# Patient Record
Sex: FEMALE | Race: WHITE
[De-identification: names, ages, dates, MRNs, and addresses within clinical notes are randomized per-mention and may not be internally consistent; named-entity substitution may affect disease eponyms.]

---

## 2019-09-05 ENCOUNTER — HOSPITAL ENCOUNTER (OUTPATIENT)
Dept: HOSPITAL 92 - BICMAMMO | Age: 74
Discharge: HOME | End: 2019-09-05
Payer: MEDICARE

## 2019-09-05 DIAGNOSIS — N64.89: ICD-10-CM

## 2019-09-05 DIAGNOSIS — M54.5: ICD-10-CM

## 2019-09-05 DIAGNOSIS — Z79.899: ICD-10-CM

## 2019-09-05 DIAGNOSIS — Z12.31: Primary | ICD-10-CM

## 2019-09-05 DIAGNOSIS — M85.852: ICD-10-CM

## 2019-09-05 DIAGNOSIS — M80.00XA: ICD-10-CM

## 2019-09-05 DIAGNOSIS — M80.88XA: ICD-10-CM

## 2019-09-05 DIAGNOSIS — Z98.82: ICD-10-CM

## 2019-09-05 DIAGNOSIS — M85.851: ICD-10-CM

## 2019-09-05 DIAGNOSIS — Z91.89: ICD-10-CM

## 2019-09-05 PROCEDURE — 77067 SCR MAMMO BI INCL CAD: CPT

## 2019-09-05 PROCEDURE — 77080 DXA BONE DENSITY AXIAL: CPT

## 2019-09-05 PROCEDURE — 77063 BREAST TOMOSYNTHESIS BI: CPT

## 2019-09-05 NOTE — BD
EXAM: DEXA bone density examination



HISTORY: 74-year-old postmenopausal female for screening



COMPARISON: 11/19/2014



FINDINGS:

Left femoral neck--bone mineral density0.580; T score -2.4

Total proximal left femur--bone mineral density 0.787; T score -1.3



Right femoral neck--bone mineral density0.616; T score -2.1

Total proximal right femur--bone mineral density 0.816; T score -1.0



IMPRESSION: Osteopenia. When compared to the prior examination, the bone density in the right femoral
 neck has increased approximately 4.5%.



Reported By: Sonny Kaur 

Electronically Signed:  9/5/2019 2:48 PM

## 2019-09-05 NOTE — MMO
Bilateral MAMMO Bilat Screen [facility].

 

CLINICAL HISTORY:

Patient is 74 years old and is seen for screening. The patient has no family

history of breast cancer.  The patient has no personal history of cancer. The

patient has a history of bilateral Implants - benign and bilateral Excisional

Biopsy - benign.

 

VIEWS:

The views performed were:  bilateral craniocaudal; bilateral craniocaudal with

tomosynthesis; bilateral mediolateral oblique; bilateral mediolateral oblique

with tomosynthesis; and bilateral Implant displaced with tomosynthesis.

 

FILMS COMPARED:

The present examination has been compared to prior imaging studies performed at

[facility] on 03/21/2018, and at [facility] on

11/19/2014, 01/11/2016 and 02/07/2017.

 

MAMMOGRAM FINDINGS:

The breasts are heterogeneously dense, which could obscure a lesion on

mammography.

 

Finding 1:  Normal implants are present.

 

Finding 2:  There are stable benign appearing calcifications seen in the right

breast.

 

There are no suspicious masses, suspicious calcifications, or new areas of

architectural distortion.

 

IMPRESSION:

FINDING 1:  IMPLANT FINDINGS IN BOTH BREASTS ARE BENIGN.

 

BILATERAL BREAST IMPLANTS LIMIT SENSITIVITY OF MAMMOGRAPHY, AND COULD OBSCURE

UNDERLYING LESIONS.

 

FINDING 2:  STABLE CALCIFICATIONS IN THE RIGHT BREAST ARE BENIGN.

 

A ROUTINE FOLLOW-UP MAMMOGRAM IN 1 YEAR IS RECOMMENDED.

 

THE RESULTS OF THIS EXAM WERE SENT TO THE PATIENT.

 

ACR BI-RADS Category 2 - Benign finding

 

MAMMOGRAPHY NOTE:

 1. A negative mammogram report should not delay a biopsy if a dominant of

 clinically suspicious mass is present.

 2. Approximately 10% to 15% of breast cancers are not detected by

 mammography.

 3. Adenosis and dense breasts may obscure an underlying neoplasm.

 

 

Reported by: HUGH LOBO MD

Electonically Signed: [id number]

## 2020-12-21 ENCOUNTER — HOSPITAL ENCOUNTER (OUTPATIENT)
Dept: HOSPITAL 92 - BICMAMMO | Age: 75
Discharge: HOME | End: 2020-12-21
Payer: MEDICARE

## 2020-12-21 DIAGNOSIS — Z98.82: ICD-10-CM

## 2020-12-21 DIAGNOSIS — Z12.31: Primary | ICD-10-CM

## 2020-12-21 DIAGNOSIS — Z91.89: ICD-10-CM

## 2020-12-21 PROCEDURE — 77063 BREAST TOMOSYNTHESIS BI: CPT

## 2020-12-21 PROCEDURE — 77067 SCR MAMMO BI INCL CAD: CPT

## 2020-12-21 NOTE — MMO
Bilateral MAMMO Bilat Screen [facility].

 

CLINICAL HISTORY:

Patient is 75 years old and is seen for screening. The patient has no family

history of breast cancer.  The patient has no personal history of cancer. The

patient has a history of bilateral Implants - benign and bilateral Excisional

Biopsy - benign.

 

VIEWS:

The views performed were:  bilateral craniocaudal with tomosynthesis; bilateral

mediolateral oblique with tomosynthesis; and bilateral Implant displaced.

 

FILMS COMPARED:

The present examination has been compared to prior imaging studies performed at

[facility] on 03/21/2018 and 09/05/2019, and at [facility] on 01/11/2016 and 02/07/2017.

 

This study has been interpreted with the assistance of computer-aided detection.

 

MAMMOGRAM FINDINGS:

The breasts are heterogeneously dense, which could obscure a lesion on

mammography.

 

There are no suspicious masses, suspicious calcifications, or new areas of

architectural distortion.

 

IMPRESSION:

THERE IS NO MAMMOGRAPHIC EVIDENCE OF MALIGNANCY.

 

A ROUTINE FOLLOW-UP MAMMOGRAM IN 1 YEAR IS RECOMMENDED.

 

THE RESULTS OF THIS EXAM WERE SENT TO THE PATIENT.

 

ACR BI-RADS Category 1 - Negative

 

MAMMOGRAPHY NOTE:

 1. A negative mammogram report should not delay a biopsy if a dominant of

 clinically suspicious mass is present.

 2. Approximately 10% to 15% of breast cancers are not detected by

 mammography.

 3. Adenosis and dense breasts may obscure an underlying neoplasm.

 

 

Reported by: CHARLEY SOTO MD

Electonically Signed: [id number]